# Patient Record
Sex: FEMALE | Race: BLACK OR AFRICAN AMERICAN | NOT HISPANIC OR LATINO | URBAN - METROPOLITAN AREA
[De-identification: names, ages, dates, MRNs, and addresses within clinical notes are randomized per-mention and may not be internally consistent; named-entity substitution may affect disease eponyms.]

---

## 2018-04-25 ENCOUNTER — OFFICE (OUTPATIENT)
Dept: URBAN - METROPOLITAN AREA CLINIC 11 | Facility: CLINIC | Age: 68
End: 2018-04-25

## 2018-04-25 VITALS
HEART RATE: 112 BPM | HEIGHT: 67 IN | SYSTOLIC BLOOD PRESSURE: 139 MMHG | WEIGHT: 243 LBS | DIASTOLIC BLOOD PRESSURE: 73 MMHG

## 2018-04-25 DIAGNOSIS — R11.10 VOMITING, UNSPECIFIED: ICD-10-CM

## 2018-04-25 PROCEDURE — 99202 OFFICE O/P NEW SF 15 MIN: CPT | Performed by: INTERNAL MEDICINE

## 2018-05-10 ENCOUNTER — AMBULATORY SURGICAL CENTER (OUTPATIENT)
Dept: URBAN - METROPOLITAN AREA SURGERY 3 | Facility: SURGERY | Age: 68
End: 2018-05-10
Payer: MEDICARE

## 2018-05-10 ENCOUNTER — OFFICE (OUTPATIENT)
Dept: URBAN - METROPOLITAN AREA PATHOLOGY 22 | Facility: PATHOLOGY | Age: 68
End: 2018-05-10
Payer: MEDICARE

## 2018-05-10 ENCOUNTER — AMBULATORY SURGICAL CENTER (OUTPATIENT)
Dept: URBAN - METROPOLITAN AREA SURGERY 3 | Facility: SURGERY | Age: 68
End: 2018-05-10

## 2018-05-10 VITALS
SYSTOLIC BLOOD PRESSURE: 105 MMHG | DIASTOLIC BLOOD PRESSURE: 70 MMHG | OXYGEN SATURATION: 93 % | HEART RATE: 96 BPM | SYSTOLIC BLOOD PRESSURE: 110 MMHG | SYSTOLIC BLOOD PRESSURE: 126 MMHG | OXYGEN SATURATION: 100 % | HEIGHT: 67 IN | RESPIRATION RATE: 18 BRPM | TEMPERATURE: 98 F | HEART RATE: 89 BPM | SYSTOLIC BLOOD PRESSURE: 110 MMHG | SYSTOLIC BLOOD PRESSURE: 116 MMHG | HEART RATE: 97 BPM | TEMPERATURE: 97 F | HEART RATE: 91 BPM | SYSTOLIC BLOOD PRESSURE: 116 MMHG | OXYGEN SATURATION: 95 % | DIASTOLIC BLOOD PRESSURE: 61 MMHG | DIASTOLIC BLOOD PRESSURE: 79 MMHG | DIASTOLIC BLOOD PRESSURE: 61 MMHG | HEART RATE: 97 BPM | DIASTOLIC BLOOD PRESSURE: 79 MMHG | TEMPERATURE: 97 F | HEART RATE: 94 BPM | DIASTOLIC BLOOD PRESSURE: 48 MMHG | HEART RATE: 91 BPM | SYSTOLIC BLOOD PRESSURE: 124 MMHG | SYSTOLIC BLOOD PRESSURE: 124 MMHG | HEIGHT: 67 IN | OXYGEN SATURATION: 92 % | OXYGEN SATURATION: 95 % | OXYGEN SATURATION: 100 % | DIASTOLIC BLOOD PRESSURE: 75 MMHG | OXYGEN SATURATION: 92 % | WEIGHT: 244 LBS | SYSTOLIC BLOOD PRESSURE: 105 MMHG | WEIGHT: 244 LBS | HEART RATE: 96 BPM | SYSTOLIC BLOOD PRESSURE: 126 MMHG | RESPIRATION RATE: 18 BRPM | HEART RATE: 94 BPM | DIASTOLIC BLOOD PRESSURE: 75 MMHG | DIASTOLIC BLOOD PRESSURE: 48 MMHG | DIASTOLIC BLOOD PRESSURE: 70 MMHG | TEMPERATURE: 98 F | RESPIRATION RATE: 19 BRPM | HEART RATE: 89 BPM | RESPIRATION RATE: 19 BRPM | OXYGEN SATURATION: 93 %

## 2018-05-10 DIAGNOSIS — R11.10 VOMITING, UNSPECIFIED: ICD-10-CM

## 2018-05-10 DIAGNOSIS — B96.81 HELICOBACTER PYLORI [H. PYLORI] AS THE CAUSE OF DISEASES CLA: ICD-10-CM

## 2018-05-10 DIAGNOSIS — K29.50 UNSPECIFIED CHRONIC GASTRITIS WITHOUT BLEEDING: ICD-10-CM

## 2018-05-10 PROBLEM — K31.89 OTHER DISEASES OF STOMACH AND DUODENUM: Status: ACTIVE | Noted: 2018-05-10

## 2018-05-10 PROCEDURE — 43239 EGD BIOPSY SINGLE/MULTIPLE: CPT | Performed by: INTERNAL MEDICINE

## 2018-05-10 PROCEDURE — 88313 SPECIAL STAINS GROUP 2: CPT | Performed by: INTERNAL MEDICINE

## 2018-05-10 PROCEDURE — 88342 IMHCHEM/IMCYTCHM 1ST ANTB: CPT | Performed by: INTERNAL MEDICINE

## 2018-05-10 PROCEDURE — 88305 TISSUE EXAM BY PATHOLOGIST: CPT | Performed by: INTERNAL MEDICINE

## 2018-05-10 PROCEDURE — 88312 SPECIAL STAINS GROUP 1: CPT | Performed by: INTERNAL MEDICINE

## 2018-05-10 NOTE — SERVICEHPINOTES
68-year-old woman who presents to me for 1st time evaluation of nonbilious, mostly nonbloody emesis (rarely sees very low volume blood tinge) that she has been having 3 to 4 times a day for the last 1 year. Patient states that her symptoms have become more severe as of late, hence prompting a gastrointestinal evaluation. Patient vomits mostly water and phlegm. She is eating normally, eats the full amount of her food, and does not report any early satiety, dysphagia, or odynophagia. She has been on omeprazole for the better part of a year with no relief in her symptoms. Patient notices the symptoms primarily when she is awake, and occasionally they can wake the patient up at night. It does not seem to be related to her oral intake. She carries around a zip block bag to help catch the water and phlegm that comes up when she has these episodes. Patient has a long-standing diabetic for over 20 years. She has questionable seasonal allergies. She is not having any abdominal pain or any significant constipation. Patient has no underlying cardiopulmonary issues. She has no significant gastrointestinal family history. She has had a colonoscopy in 2016 that she states was done at the [facility]. Patient reports that the colonoscopy was normal to her recollection. EGD for further evaluation. BR

## 2018-05-10 NOTE — SERVICEHPINOTES
68-year-old woman who presents to me for 1st time evaluation of nonbilious, mostly nonbloody emesis (rarely sees very low volume blood tinge) that she has been having 3 to 4 times a day for the last 1 year. Patient states that her symptoms have become more severe as of late, hence prompting a gastrointestinal evaluation. Patient vomits mostly water and phlegm. She is eating normally, eats the full amount of her food, and does not report any early satiety, dysphagia, or odynophagia. She has been on omeprazole for the better part of a year with no relief in her symptoms. Patient notices the symptoms primarily when she is awake, and occasionally they can wake the patient up at night. It does not seem to be related to her oral intake. She carries around a zip block bag to help catch the water and phlegm that comes up when she has these episodes. Patient has a long-standing diabetic for over 20 years. She has questionable seasonal allergies. She is not having any abdominal pain or any significant constipation. Patient has no underlying cardiopulmonary issues. She has no significant gastrointestinal family history. She has had a colonoscopy in 2016 that she states was done at the [facility]. Patient reports that the colonoscopy was normal to her recollection. EGD for further evaluation. BR

## 2018-05-24 ENCOUNTER — OFFICE (OUTPATIENT)
Dept: URBAN - METROPOLITAN AREA CLINIC 11 | Facility: CLINIC | Age: 68
End: 2018-05-24

## 2018-05-24 VITALS
DIASTOLIC BLOOD PRESSURE: 74 MMHG | HEART RATE: 112 BPM | HEIGHT: 67 IN | SYSTOLIC BLOOD PRESSURE: 135 MMHG | WEIGHT: 244 LBS

## 2018-05-24 DIAGNOSIS — K29.50 UNSPECIFIED CHRONIC GASTRITIS WITHOUT BLEEDING: ICD-10-CM

## 2018-05-24 DIAGNOSIS — B96.81 HELICOBACTER PYLORI [H. PYLORI] AS THE CAUSE OF DISEASES CLA: ICD-10-CM

## 2018-05-24 DIAGNOSIS — R11.10 VOMITING, UNSPECIFIED: ICD-10-CM

## 2018-05-24 LAB
ANGIOTENSIN-CONVERTING ENZYME: ACE: 45 U/L (ref 14–82)
H PYLORI BREATH TEST: (no result)
H. PYLORI BREATH COLLECTION: (no result)
H. PYLORI BREATH TEST: POSITIVE
PANEL 083935: HIV SCREEN 4TH GENERATION WRFX: NON REACTIVE
QUANTIFERON IN TUBE: INTERPRETATION: (no result)
QUANTIFERON IN TUBE: QFT TB AG MINUS NIL VALUE: 0.01 IU/ML
QUANTIFERON IN TUBE: QUANTIFERON CRITERIA: (no result)
QUANTIFERON IN TUBE: QUANTIFERON MITOGEN VALUE: >10 IU/ML
QUANTIFERON IN TUBE: QUANTIFERON NIL VALUE: 0.18 IU/ML
QUANTIFERON IN TUBE: QUANTIFERON TB AG VALUE: 0.19 IU/ML
QUANTIFERON IN TUBE: QUANTIFERON TB GOLD: NEGATIVE
QUANTIFERON TB GOLD (IN TUBE): QUANTIFERON INCUBATION: (no result)
RAPID PLASMA REAGIN, QUANT: NON REACTIVE

## 2018-05-24 PROCEDURE — 99213 OFFICE O/P EST LOW 20 MIN: CPT | Performed by: INTERNAL MEDICINE
